# Patient Record
Sex: MALE | Race: WHITE
[De-identification: names, ages, dates, MRNs, and addresses within clinical notes are randomized per-mention and may not be internally consistent; named-entity substitution may affect disease eponyms.]

---

## 2019-09-30 ENCOUNTER — HOSPITAL ENCOUNTER (OUTPATIENT)
Dept: HOSPITAL 62 - RAD | Age: 64
End: 2019-09-30
Attending: NURSE PRACTITIONER
Payer: MEDICARE

## 2019-09-30 DIAGNOSIS — D17.1: Primary | ICD-10-CM

## 2019-09-30 PROCEDURE — 76604 US EXAM CHEST: CPT

## 2019-09-30 NOTE — RADIOLOGY REPORT (SQ)
EXAM DESCRIPTION:  U/S CHEST



COMPLETED DATE/TIME:  9/30/2019 5:07 pm



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 
NEOPLASM OF SKIN, SUBCU OF TRUNK



COMPARISON:  None.



EXAM PARAMETERS:  TECHNIQUE: Dynamic and static grayscale images acquired of the localized site of cl
inical concern and recorded on PACS. Additional selected color Doppler and spectral images recorded.

LIMITATIONS: None.



FINDINGS:  SKIN AND SUBCUTANEOUS TISSUES: Poorly defined heterogeneous nodules.  There is 1 superior 
to the shoulder blade measuring 3.5 x 3.2 x 0.9 cm.  There is 1 inferior to the shoulder blade measur
ing 4.7 x 5.2 x 1.5 cm.  These do not appear particularly vascular.

DEEP SOFT TISSUES/MUSCLES: No masses.  No fluid collections. No edema.

VASCULAR: No increased or decreased vascularity.  No occlusions.

OTHER: No other significant finding.



IMPRESSION:  Nonspecific subcutaneous nodules.  These do not obviously represent lipomas.



TECHNICAL DOCUMENTATION:  JOB ID:  [id number]

 2011 [employer]- All Rights Reserved



Reading location - IP/workstation name: [email or web address]